# Patient Record
Sex: FEMALE | Race: AMERICAN INDIAN OR ALASKA NATIVE
[De-identification: names, ages, dates, MRNs, and addresses within clinical notes are randomized per-mention and may not be internally consistent; named-entity substitution may affect disease eponyms.]

---

## 2018-03-19 ENCOUNTER — HOSPITAL ENCOUNTER (INPATIENT)
Dept: HOSPITAL 14 - H.EROB2 | Age: 28
LOS: 2 days | Discharge: HOME | DRG: 373 | End: 2018-03-21
Attending: SPECIALIST | Admitting: SPECIALIST
Payer: COMMERCIAL

## 2018-03-19 VITALS — BODY MASS INDEX: 26.9 KG/M2

## 2018-03-19 VITALS — OXYGEN SATURATION: 100 %

## 2018-03-19 DIAGNOSIS — Z3A.39: ICD-10-CM

## 2018-03-19 LAB
BASOPHILS # BLD AUTO: 0 K/UL (ref 0–0.2)
BASOPHILS NFR BLD: 0.2 % (ref 0–2)
EOSINOPHIL # BLD AUTO: 0.1 K/UL (ref 0–0.7)
EOSINOPHIL NFR BLD: 1.6 % (ref 0–4)
ERYTHROCYTE [DISTWIDTH] IN BLOOD BY AUTOMATED COUNT: 14.6 % (ref 11.5–14.5)
HGB BLD-MCNC: 11.9 G/DL (ref 12–16)
LYMPHOCYTES # BLD AUTO: 1.5 K/UL (ref 1–4.3)
LYMPHOCYTES NFR BLD AUTO: 16 % (ref 20–40)
MCH RBC QN AUTO: 28.6 PG (ref 27–31)
MCHC RBC AUTO-ENTMCNC: 34 G/DL (ref 33–37)
MCV RBC AUTO: 84.2 FL (ref 81–99)
MONOCYTES # BLD: 0.9 K/UL (ref 0–0.8)
MONOCYTES NFR BLD: 9.4 % (ref 0–10)
NEUTROPHILS # BLD: 6.8 K/UL (ref 1.8–7)
NEUTROPHILS NFR BLD AUTO: 72.8 % (ref 50–75)
NRBC BLD AUTO-RTO: 0.1 % (ref 0–0)
PLATELET # BLD: 227 K/UL (ref 130–400)
PMV BLD AUTO: 9.9 FL (ref 7.2–11.7)
RBC # BLD AUTO: 4.17 MIL/UL (ref 3.8–5.2)
WBC # BLD AUTO: 9.3 K/UL (ref 4.8–10.8)

## 2018-03-19 PROCEDURE — 4A1HXCZ MONITORING OF PRODUCTS OF CONCEPTION, CARDIAC RATE, EXTERNAL APPROACH: ICD-10-PCS | Performed by: OBSTETRICS & GYNECOLOGY

## 2018-03-19 NOTE — OBDS
===================================

DELIVERY PERSONNEL

===================================

   

Delivery Doctor:  NAA Petersen MD

Circulator:  Gavi Davis RN

Anesthesiologist:  MELCHOR Velasquez MD

   

===================================

MATERNAL INFORMATION

===================================

   

Delivery Anesthesia:  Epidural

Medications in Delivery:  Oxytocin

Estimated  Blood Loss (ml):  200ml

Maternal Complications:  None

Provider Comments:  delivery of live baby girl apgar 9/9 clear fluid cord with 3vessels placenta inta
ct no lacerations no repair ebl 200cc

   

===================================

LABOR SUMMARY

===================================

   

EDC:  2018 00:00

No. Babies in Womb:  1

 Attempted:  No

Labor Anesthesia:  Epidural

   

===================================

LABOR INFORMATION

===================================

   

Reason for Induction:  Not Applicable

Onset of Labor:  2018 18:00

Oxytocin:  Augmentation

Group B Beta Strep:  Negative

Steroids Given:  None

Reason Steroids Not Administered:  Not Applicable

   

===================================

MEMBRANES

===================================

   

Membranes Rupture Method:  Artificial

Rupture of Membranes:  2018 18:45

Amniotic Fluid Color:  Clear

Amniotic Fluid Amount:  Moderate

Amniotic Fluid Odor:  Normal

   

===================================

VAGINAL DELIVERY

===================================

   

Episiotomy:  None

Laceration Extension:  N/A

Laceration Type:  None

Laceration Repair Note:  none

Count Comment:  correct

   

===================================

PRESENTATION/POSITION BABY A

===================================

   

Presentation:  Cephalic

Cephalic Presentation:  Vertex

   

===================================

IDENTIFICATION/MEDS BABY A

===================================

   

ID Band Number:  [id number]

ID Band Location:  Left Leg; Left Arm

## 2018-03-19 NOTE — OBADHP
===========================

Datetime: 2018 03:15

===========================

   

Admit Comment, IP Provider:  27 y/o F  at 39.3 weeks GA with LAYLA 18 presents to [facility] c/o u
terine CTX that began at 23:50. CTX's are painful and occurs every 4-7 mins accoding to pt. FM presen
t. No VB or LOF. pt denies fever, headache, CP, SOB, N/V, rash or cald tenderness.

   All systems reviewed and negative except as above. 

   NKDA

   meds: PNV and Iron

   PN Care: w/ Dr Davonte Petersen.

   PN Labs: GBS neg, Rubella immune, GC/Chlamydia neg, HIV neg, RPR neg, HBsAg neg. 

      

   OBHx: , CF carrier.

   PMHx: pituitary cyst

   PSHx: breast augmentation 05/13/15; D_C 03/13/15.

   FHx: NC

   SHx: No tobacco, alcohol or rec drugs.

      

   A/P 

   27y/o F with IUP at 39.3 weeks GA, progressing in labor.

   --Continuous FHT

   --Admit to unit.

   --Initiate labor protocol

   Case was discussed with over the phone with Dr Davonte Petersen who agreed with above plan.

   GTolentino PGY-1.

Pelvic Type - PN:  Adequate

Extremities - PN:  Normal

Abdomen - PN:  Normal

Back - PN:  Normal

Breast - PN:  Not Done

Lungs - PN:  Normal

Heart - PN:  Normal

Thyroid - PN:  Normal

Neurologic - PN:  Normal

HEENT - PN:  Normal

General - PN:  Normal

FHR - Baseline A Provider:  145

Comments, ACOG Physical Exam:  Pelvic exam: fingertip/soft/high.

   Exam performed with nurse Melba as chaperone. 

IP Prenatal Hx Assessment:  The Prenatal History has been Reviewed and is Current

Vital Signs Provider:  Reviewed

IP Chief Complaint:  Uterine contractions

NICHD Variability Prov Fetus A:  Moderate 6-25bpm

NICHD Accel Fetus A IP Provider:  15X15

FHR Category Provider Fetus A:  Category I

NICHD Decel Fetus A IP Provider:  None

Dilatation, Provider:  FT

Effacement, Provider:  Soft

Station, Provider:  High

Genitourinary Exam:  Normal

DTRs - PN:  Normal

EGA AdmitDate IP:  39.3

IP Adm Impression:  Term, intrauterine pregnancy; No Active Labor

IP Admit Plan:  Admit to unit; Initiate  labor protocol; Observation/Evaluation

## 2018-03-20 LAB
BASOPHILS # BLD AUTO: 0 K/UL (ref 0–0.2)
BASOPHILS NFR BLD: 0.1 % (ref 0–2)
EOSINOPHIL # BLD AUTO: 0.1 K/UL (ref 0–0.7)
EOSINOPHIL NFR BLD: 0.7 % (ref 0–4)
ERYTHROCYTE [DISTWIDTH] IN BLOOD BY AUTOMATED COUNT: 14.6 % (ref 11.5–14.5)
HGB BLD-MCNC: 11.6 G/DL (ref 12–16)
LYMPHOCYTES # BLD AUTO: 1.7 K/UL (ref 1–4.3)
LYMPHOCYTES NFR BLD AUTO: 14.4 % (ref 20–40)
MCH RBC QN AUTO: 27.9 PG (ref 27–31)
MCHC RBC AUTO-ENTMCNC: 33 G/DL (ref 33–37)
MCV RBC AUTO: 84.7 FL (ref 81–99)
MONOCYTES # BLD: 1.1 K/UL (ref 0–0.8)
MONOCYTES NFR BLD: 9.6 % (ref 0–10)
NEUTROPHILS # BLD: 8.7 K/UL (ref 1.8–7)
NEUTROPHILS NFR BLD AUTO: 75.2 % (ref 50–75)
NRBC BLD AUTO-RTO: 0.1 % (ref 0–0)
PLATELET # BLD: 199 K/UL (ref 130–400)
PMV BLD AUTO: 9.7 FL (ref 7.2–11.7)
RBC # BLD AUTO: 4.14 MIL/UL (ref 3.8–5.2)
WBC # BLD AUTO: 11.6 K/UL (ref 4.8–10.8)

## 2018-03-20 NOTE — OBPPN
===========================

Datetime: 03/20/2018 09:52

===========================

   

PP Pain Prov:  Within normal limits

PP Nausea Prov:  Denies

PP Flatus Prov:  Yes

PP BM Prov:  Yes

PP Breasts Prov:  Normal

PP Heart Prov:  Normal

PP Lungs Prov:  Normal

PP Abdomen/Uterus Prov:  Normal

PP Lochia Prov:  Normal

PP Vulva/Perineum Prov:  Normal

PP CVA Tenderness Prov:  Normal

PP Extremities Prov:  Normal

PP Breastfeeding Progress Prov:  Normal

PP Impression Prov:  Normal postpartum progression

PP Plan Prov:  Continue present management

PP Progress Note Prov:  stable ppd 1 continue present care

IP PP Procedures:  None

Vital Signs Provider PP:  Reviewed; Within Normal Limits

## 2018-03-21 VITALS
DIASTOLIC BLOOD PRESSURE: 72 MMHG | RESPIRATION RATE: 20 BRPM | HEART RATE: 79 BPM | TEMPERATURE: 98.4 F | SYSTOLIC BLOOD PRESSURE: 115 MMHG
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
## 2018-03-21 NOTE — OBDCSUM
===========================

Datetime: 03/21/2018 08:32

===========================

   

Discharged to, Provider:  Home

Follow up at, Provider:  [facility] / Dr Borges

Disch Instr Activity:  Normal activity

Disch Instr Diet:  Regular

Discharge Instructions, Provider:  Routine instructions given

Discharge Diagnosis, Provider:  Term Pregnancy Delivered

Follow up in weeks, Provider:  6weeks

Disch Referrals:  None

Contraception discussed, Prov:  Yes

Disch Activity Restrictions:  No sexual activity; Nothing in vagina - Gough, tampons, douche